# Patient Record
Sex: FEMALE | Race: WHITE | URBAN - METROPOLITAN AREA
[De-identification: names, ages, dates, MRNs, and addresses within clinical notes are randomized per-mention and may not be internally consistent; named-entity substitution may affect disease eponyms.]

---

## 2023-12-28 ENCOUNTER — APPOINTMENT (OUTPATIENT)
Dept: OCCUPATIONAL MEDICINE | Age: 18
End: 2023-12-28
Attending: NURSE PRACTITIONER

## 2024-01-08 ENCOUNTER — OFFICE VISIT (OUTPATIENT)
Dept: OCCUPATIONAL MEDICINE | Age: 19
End: 2024-01-08
Attending: NURSE PRACTITIONER

## 2024-01-08 DIAGNOSIS — Z00.00 ROUTINE GENERAL MEDICAL EXAMINATION AT A HEALTH CARE FACILITY: Primary | ICD-10-CM

## 2024-01-08 LAB
HBV SURFACE AB SER QL: NONREACTIVE
HBV SURFACE AB SERPL IA-ACNC: 6.82 MIU/ML
RUBV IGG SER QL: POSITIVE
RUBV IGG SER-ACNC: 112.3 IU/ML (ref 10–?)

## 2024-01-08 PROCEDURE — 86765 RUBEOLA ANTIBODY: CPT

## 2024-01-08 PROCEDURE — 86787 VARICELLA-ZOSTER ANTIBODY: CPT

## 2024-01-08 PROCEDURE — 86762 RUBELLA ANTIBODY: CPT

## 2024-01-08 PROCEDURE — 86480 TB TEST CELL IMMUN MEASURE: CPT

## 2024-01-08 PROCEDURE — 86706 HEP B SURFACE ANTIBODY: CPT

## 2024-01-08 PROCEDURE — 86735 MUMPS ANTIBODY: CPT

## 2024-01-11 LAB
M TB IFN-G CD4+ T-CELLS BLD-ACNC: 0 IU/ML
M TB TUBERC IFN-G BLD QL: NEGATIVE
M TB TUBERC IGNF/MITOGEN IGNF CONTROL: 9.47 IU/ML
MEV IGG SER-ACNC: 134 AU/ML (ref 16.5–?)
MUV IGG SER IA-ACNC: 178 AU/ML (ref 11–?)
QFT TB1 AG MINUS NIL: 0.02 IU/ML
QFT TB2 AG MINUS NIL: 0 IU/ML
VZV IGG SER IA-ACNC: 523 (ref 165–?)

## 2024-10-26 ENCOUNTER — HOSPITAL ENCOUNTER (EMERGENCY)
Facility: HOSPITAL | Age: 19
Discharge: HOME | End: 2024-10-26
Attending: EMERGENCY MEDICINE
Payer: MEDICAID

## 2024-10-26 VITALS
RESPIRATION RATE: 15 BRPM | BODY MASS INDEX: 25.69 KG/M2 | DIASTOLIC BLOOD PRESSURE: 80 MMHG | HEART RATE: 89 BPM | TEMPERATURE: 98 F | OXYGEN SATURATION: 100 % | SYSTOLIC BLOOD PRESSURE: 124 MMHG | HEIGHT: 63 IN | WEIGHT: 145 LBS

## 2024-10-26 DIAGNOSIS — M54.16 LUMBAR RADICULOPATHY: Primary | ICD-10-CM

## 2024-10-26 LAB — B-HCG UR QL: NEGATIVE

## 2024-10-26 PROCEDURE — 81025 URINE PREGNANCY TEST: CPT

## 2024-10-26 PROCEDURE — 99283 EMERGENCY DEPT VISIT LOW MDM: CPT

## 2024-10-26 RX ORDER — METHYLPREDNISOLONE 4 MG/1
TABLET ORAL
Qty: 1 EACH | Refills: 0 | Status: SHIPPED | OUTPATIENT
Start: 2024-10-26

## 2024-10-26 NOTE — ED INITIAL ASSESSMENT (HPI)
Patient reports lower back pain x1 week, worse today. Unknown if she injured it. Pain worse with ROM and changing position. Denies urinary complaints.

## 2024-10-27 NOTE — ED PROVIDER NOTES
Patient Seen in: [facility] Emergency Department      History   No chief complaint on file.    Stated Complaint: Back Pain    Subjective:   HPI      19-year-old female presents for evaluation of back pain.  Patient reports low back pain for the past week.  Initially had some improvement Thursday, now worsening.  Describes low back pain that radiates towards the right buttock.  No recent trauma, fever, focal weakness or numbness, incontinence, saddle anesthesia, urinary complaints.  Taking Tylenol and ibuprofen at home without relief.    Objective:     Past Medical History:    Asthma (HCC)              History reviewed. No pertinent surgical history.             Social History     Socioeconomic History    Marital status: Single   Tobacco Use    Smoking status: Never    Smokeless tobacco: Never   Substance and Sexual Activity    Alcohol use: Yes     Comment: soc    Drug use: Never     Social Drivers of Health     Financial Resource Strain: Not on File (2024)    Received from [facility]    Financial Resource Strain     Financial Resource Strain: 0   Food Insecurity: Not on File (2024)    Received from [facility]    Food Insecurity     Food: 0   Transportation Needs: Not on File (2024)    Received from [facility]    Transportation Needs     Transportation: 0   Physical Activity: Not on File (2024)    Received from [facility]    Physical Activity     Physical Activity: 0   Stress: Not on File (2024)    Received from [facility]    Stress     Stress: 0   Social Connections: Not on File (9/15/2024)    Received from [facility]    Social Connections     Connectedness: 0   Housing Stability: Not on File (2024)    Received from [facility]    Housing Stability     Housin                  Physical Exam     ED Triage Vitals [10/26/24 1741]   /80   Pulse 89   Resp 15   Temp 98.2 °F (36.8 °C)   Temp src Temporal   SpO2 100 %   O2 Device None (Room air)       Current Vitals:   Vital Signs  BP: 124/80  Pulse: 89  Resp:  15  Temp: 98.2 °F (36.8 °C)  Temp src: Temporal    Oxygen Therapy  SpO2: 100 %  O2 Device: None (Room air)        Physical Exam  Vitals and nursing note reviewed.   Constitutional:       General: She is not in acute distress.     Appearance: She is well-developed.   HENT:      Head: Normocephalic and atraumatic.   Eyes:      Conjunctiva/sclera: Conjunctivae normal.   Cardiovascular:      Rate and Rhythm: Normal rate and regular rhythm.      Heart sounds: Normal heart sounds.   Pulmonary:      Effort: Pulmonary effort is normal. No respiratory distress.      Breath sounds: Normal breath sounds.   Abdominal:      General: Bowel sounds are normal. There is no distension.      Palpations: Abdomen is soft.      Tenderness: There is no abdominal tenderness. There is no guarding or rebound.   Musculoskeletal:         General: Normal range of motion.      Cervical back: Normal range of motion and neck supple.   Skin:     General: Skin is warm and dry.      Findings: No rash.   Neurological:      General: No focal deficit present.      Mental Status: She is alert and oriented to person, place, and time.      Sensory: No sensory deficit.      Motor: No weakness.      Comments: + straight leg test on the right             ED Course     Labs Reviewed   POCT PREGNANCY URINE - Normal                   MDM              Medical Decision Making  Differential diagnosis includes but is not limited to lumbar radiculopathy, musculoskeletal strain, muscle spasm    Well-appearing patient with atraumatic pain, history and exam consistent with radiculopathy.  Discussed plan for steroids, outpatient follow-up, return precautions.  Patient verbalizes understanding of and agreement with this plan.     Problems Addressed:  Lumbar radiculopathy: acute illness or injury    Risk  Prescription drug management.        Disposition and Plan     Clinical Impression:  1. Lumbar radiculopathy         Disposition:  Discharge  10/26/2024  7:28  pm    Follow-up:  No follow-up provider specified.        Medications Prescribed:  Discharge Medication List as of 10/26/2024  7:31 PM        START taking these medications    Details   methylPREDNISolone 4 MG Oral Tablet Therapy Pack Dosepack: use as directed on packaging, Normal, Disp-1 each, R-0                 Supplementary Documentation:

## 2024-10-27 NOTE — DISCHARGE INSTRUCTIONS
Steroids prescribed you today as directed.    Take Tylenol as needed for pain.    Use ice or heat to help with pain.    See primary care in 1 week for follow-up.    Return to the ER if you develop weakness on one side your body, fever, or incontinence, or any emergent concerns.

## 2025-07-14 ENCOUNTER — OFFICE VISIT (OUTPATIENT)
Dept: OCCUPATIONAL MEDICINE | Age: 20
End: 2025-07-14
Attending: NURSE PRACTITIONER

## 2025-07-14 DIAGNOSIS — Z00.00 ROUTINE GENERAL MEDICAL EXAMINATION AT A HEALTH CARE FACILITY: Primary | ICD-10-CM

## 2025-07-14 LAB
HBV SURFACE AB SER QL: REACTIVE
HBV SURFACE AB SERPL IA-ACNC: 794.94 MIU/ML

## 2025-07-14 PROCEDURE — 86706 HEP B SURFACE ANTIBODY: CPT

## (undated) NOTE — LETTER
Date & Time: 10/26/2024, 7:42 PM  Patient: Satish Fischer  Encounter Provider(s):    Court Grant MD       To Whom It May Concern:    Satish Fischer was seen and treated in our department on 10/26/2024. She can return to work 10- .    If you have any questions or concerns, please do not hesitate to call.        _____________________________  Physician/[facility] Signature